# Patient Record
Sex: FEMALE | Race: WHITE | NOT HISPANIC OR LATINO | Employment: OTHER | URBAN - METROPOLITAN AREA
[De-identification: names, ages, dates, MRNs, and addresses within clinical notes are randomized per-mention and may not be internally consistent; named-entity substitution may affect disease eponyms.]

---

## 2017-02-22 PROCEDURE — 29125 APPL SHORT ARM SPLINT STATIC: CPT | Mod: LT

## 2017-02-22 PROCEDURE — 99284 EMERGENCY DEPT VISIT MOD MDM: CPT | Mod: 25

## 2017-02-23 ENCOUNTER — HOSPITAL ENCOUNTER (EMERGENCY)
Facility: OTHER | Age: 62
Discharge: HOME | End: 2017-02-23
Attending: EMERGENCY MEDICINE

## 2017-02-23 VITALS
TEMPERATURE: 98 F | HEART RATE: 77 BPM | HEIGHT: 69 IN | RESPIRATION RATE: 16 BRPM | OXYGEN SATURATION: 99 % | SYSTOLIC BLOOD PRESSURE: 150 MMHG | WEIGHT: 170 LBS | DIASTOLIC BLOOD PRESSURE: 80 MMHG | BODY MASS INDEX: 25.18 KG/M2

## 2017-02-23 DIAGNOSIS — S62.309A METACARPAL BONE FRACTURE: Primary | ICD-10-CM

## 2017-02-23 DIAGNOSIS — T14.90XA TRAUMA: ICD-10-CM

## 2017-02-23 PROCEDURE — 25000003 PHARM REV CODE 250: Performed by: EMERGENCY MEDICINE

## 2017-02-23 RX ORDER — HYDROCODONE BITARTRATE AND ACETAMINOPHEN 5; 325 MG/1; MG/1
1 TABLET ORAL EVERY 6 HOURS PRN
Qty: 22 TABLET | Refills: 0 | Status: SHIPPED | OUTPATIENT
Start: 2017-02-23

## 2017-02-23 RX ORDER — HYDROCODONE BITARTRATE AND ACETAMINOPHEN 5; 325 MG/1; MG/1
1 TABLET ORAL
Status: COMPLETED | OUTPATIENT
Start: 2017-02-23 | End: 2017-02-23

## 2017-02-23 RX ORDER — DOCUSATE SODIUM 100 MG/1
100 CAPSULE, LIQUID FILLED ORAL 2 TIMES DAILY
Qty: 30 CAPSULE | Refills: 0 | Status: SHIPPED | OUTPATIENT
Start: 2017-02-23

## 2017-02-23 RX ORDER — ASPIRIN 81 MG/1
81 TABLET ORAL DAILY
COMMUNITY

## 2017-02-23 RX ORDER — IBUPROFEN 400 MG/1
800 TABLET ORAL
Status: COMPLETED | OUTPATIENT
Start: 2017-02-23 | End: 2017-02-23

## 2017-02-23 RX ORDER — OMEPRAZOLE 20 MG/1
20 CAPSULE, DELAYED RELEASE ORAL DAILY
COMMUNITY

## 2017-02-23 RX ORDER — ONDANSETRON 4 MG/1
4 TABLET, ORALLY DISINTEGRATING ORAL EVERY 8 HOURS PRN
Qty: 12 TABLET | Refills: 0 | Status: SHIPPED | OUTPATIENT
Start: 2017-02-23

## 2017-02-23 RX ADMIN — HYDROCODONE BITARTRATE AND ACETAMINOPHEN 1 TABLET: 5; 325 TABLET ORAL at 12:02

## 2017-02-23 RX ADMIN — IBUPROFEN 800 MG: 400 TABLET, FILM COATED ORAL at 12:02

## 2017-02-23 NOTE — ED PROVIDER NOTES
Encounter Date: 2/22/2017    SCRIBE #1 NOTE: I, Donna Be , am scribing for, and in the presence of, Dr. Miramontes.       History     Chief Complaint   Patient presents with    Fall     fell onto left hand [place] parade. pain to left hand and wrist     Review of patient's allergies indicates:  No Known Allergies  HPI Comments: Time seen by provider: 12:29 AM    This is a 61 y.o. female who presents to the ED status post fall. Fall occurred prior to arrival. Pt fell onto her left hand after stepping in a crack on the sidewalk. Pt currently complains of pain to the left hand and wrist with associated swelling, abrasions, and bruising, but denies fever, chills, nausea, vomiting, head trauma, LOC, or numbness. Symptoms are described as constant and severe. Pt applied ice to the hand and wrist with little relief, but denies taking OTC medication to alleviate pain.     The history is provided by the patient.     Past Medical History   Diagnosis Date    Arthritis      No past medical history pertinent negatives.  Past Surgical History   Procedure Laterality Date    Tonsillectomy      Hysterectomy      Back surgery       No family history on file.  Social History   Substance Use Topics    Smoking status: Never Smoker    Smokeless tobacco: None    Alcohol use Yes      Comment: social     Review of Systems   Constitutional: Negative for chills and fever.   HENT: Negative for congestion and sore throat.         Negative for head trauma.    Eyes: Negative for redness and visual disturbance.   Respiratory: Negative for cough and shortness of breath.    Cardiovascular: Negative for chest pain and palpitations.   Gastrointestinal: Negative for abdominal pain, diarrhea, nausea and vomiting.   Genitourinary: Negative for dysuria.   Musculoskeletal: Positive for joint swelling. Negative for back pain.        Positive for pain to the left wrist and hand with associated swelling, bruising, and abrasions.    Skin:  Positive for color change and wound. Negative for rash.   Neurological: Negative for syncope, weakness, numbness and headaches.   Psychiatric/Behavioral: Negative for confusion.       Physical Exam   Initial Vitals   BP Pulse Resp Temp SpO2   02/22/17 2242 02/22/17 2242 02/22/17 2242 02/22/17 2242 02/22/17 2242   169/79 101 18 98 °F (36.7 °C) 98 %     Physical Exam    Nursing note and vitals reviewed.  Constitutional: She appears well-developed and well-nourished.   HENT:   Head: Normocephalic and atraumatic.   Eyes: Conjunctivae are normal.   Neck: Normal range of motion. Neck supple.   Cardiovascular:   Pulses:       Radial pulses are 2+ on the right side, and 2+ on the left side.   Pulmonary/Chest: No respiratory distress.   Musculoskeletal: Normal range of motion. She exhibits edema and tenderness.   Ecchymosis, tenderness, and swelling to both the dorsum and palm of L hand along the ulnar aspect. ROM is limited secondary to pain.  Sensation intact.  < 3 cap refill.     Neurological: She is alert and oriented to person, place, and time.   Ambulatory with steady gait.     Skin: Skin is warm and dry. No pallor.   Superficial abrasions noted to the L hand         ED Course   Orthopedic Injury  Date/Time: 2/23/2017 1:20 AM  Authorized by: HAYDEE PATTERSON   Performed by: HAYDEE PATTERSON.  Consent Done: Not Needed  Injury location: hand  Location details: left hand  Injury type: fracture  Fracture type: fourth metacarpal and fifth metacarpal  Pre-procedure distal perfusion: normal  Pre-procedure neurological function: normal  Pre-procedure neurovascular assessment: neurovascularly intact  Pre-procedure range of motion: reduced  Local anesthesia used: no    Anesthesia:  Local anesthesia used: no  Sedation:  Patient sedated: no    Manipulation performed: no  Immobilization: splint and sling  Splint type: ulnar gutter  Supplies used: Ortho-Glass  Complications: No  Specimens: No  Implants: No  Post-procedure neurovascular  assessment: post-procedure neurovascularly intact  Post-procedure distal perfusion: normal  Post-procedure neurological function: normal        Labs Reviewed - No data to display      Imaging Results         X-Ray Hand 3 view Left (Final result) Result time:  02/22/17 23:09:39    Final result by Harshil Torres MD (02/22/17 23:09:39)    Impression:        Acute fractures of the left proximal fifth metacarpal and distal fourth metacarpal, as above.      Electronically signed by: HARSHIL TORRES MD, MD  Date:     02/22/17  Time:    23:09     Narrative:    COMPARISON: None    FINDINGS: 3 views of the left wrist; 3 views of the left hand.        Generalized osteopenia.  Acute mildly comminuted fracture at the proximal meta-epiphyseal junction of the fifth metacarpal. There is associated mild impaction and slight apex angulation towards the dorsal and ulnar aspect. Small osseous fragments adjacent to the base of the fourth metacarpal without definite radiolucency seen, which likely arise from the fifth metacarpal comminuted fracture. No dislocation. Acute minimally displaced fracture through the distal metadiaphyseal junction of the fourth metacarpal. There is associated overlying soft tissue swelling related to both fracture sites. Carpus is otherwise intact. No dislocation. Mild degenerative change of the second through fifth DIP joints. No subcutaneous emphysema or radiodense retained foreign body.            X-Ray Wrist Complete Left (Final result) Result time:  02/22/17 23:09:39    Final result by Harshil Torres MD (02/22/17 23:09:39)    Impression:        Acute fractures of the left proximal fifth metacarpal and distal fourth metacarpal, as above.      Electronically signed by: HARSHIL TORRES MD, MD  Date:     02/22/17  Time:    23:09     Narrative:    COMPARISON: None    FINDINGS: 3 views of the left wrist; 3 views of the left hand.        Generalized osteopenia.  Acute mildly comminuted fracture at the proximal  meta-epiphyseal junction of the fifth metacarpal. There is associated mild impaction and slight apex angulation towards the dorsal and ulnar aspect. Small osseous fragments adjacent to the base of the fourth metacarpal without definite radiolucency seen, which likely arise from the fifth metacarpal comminuted fracture. No dislocation. Acute minimally displaced fracture through the distal metadiaphyseal junction of the fourth metacarpal. There is associated overlying soft tissue swelling related to both fracture sites. Carpus is otherwise intact. No dislocation. Mild degenerative change of the second through fifth DIP joints. No subcutaneous emphysema or radiodense retained foreign body.                X-Rays:   Independently Interpreted Readings:   Other Readings:  Left hand/wrist: Fracture at base of fifth metacarpal. Fracture of the distal fourth metacarpal.       Medical Decision Making:   History:   Old Medical Records: I decided to obtain old medical records.  Initial Assessment:   12:29AM:  Pt is a 60 y/o F who presents to ED s/p fall with L hand pain/swelling. Pt appears well, nontoxic. Will plan for ice pack, analgesia, xray, will continue to follow.    Independently Interpreted Test(s):   I have ordered and independently interpreted X-rays - see prior notes.  Clinical Tests:   Radiological Study: Ordered and Reviewed  Other:   I have discussed this case with another health care provider.      12:36AM:  Pt has a 5th metacarpal fx at the base and a 4 distal metacarpal fx.  I spoke with Dr. Schwartz, who recommends ulnar gutter splint    12:48AM:  I updated pt regarding results and plan for splint.  Pt is from [place] and going back tomorrow. She does have a hand surgeon that she can f/u with.  Will continue to follow.      1:16 AM:  Pt doing well.  She tolerating splint placement well and she is neurovascularly intact. Will provide her with sling along with a CD of her images and a radiology report to bring back  to [place]. I counseled pt regarding supportive care measures and splint care.  Will plan to provide pain medication to take as needed.  I have discussed with the pt ED return warnings and need to call her hand surgeon tomorrow.  Pt agreeable to plan and all questions answered.  I feel that pt is stable for discharge and management as an outpatient and no further intervention is needed at this time.  Pt is comfortable returning to the ED if needed.  Will DC home in stable condition.              Scribe Attestation:   Scribe #1: I performed the above scribed service and the documentation accurately describes the services I performed. I attest to the accuracy of the note.    Attending Attestation:           Physician Attestation for Scribe:  Physician Attestation Statement for Scribe #1: I, Dr. Miramontes, reviewed documentation, as scribed by Donna Be  in my presence, and it is both accurate and complete.                 ED Course     Clinical Impression:     1. Metacarpal bone fracture    2. Trauma                Vicky Miramontes MD  02/23/17 1403

## 2017-02-23 NOTE — DISCHARGE INSTRUCTIONS
Follow up with a hand surgeon.  Let them know you have a closed fracture of the base of the 5th metacarpal and the distal 4th metacarpal.      Keep the splint clean and dry.    We have prescribed you pain medication. Please fill and take as directed. Do not drink alcohol or drive while taking this medication.  Do not take any additional tylenol while taking this medication.  It is important to take stool softeners and/or eat plenty of fiber to prevent constipation while taking this medication.

## 2017-02-23 NOTE — ED TRIAGE NOTES
Pt c/o left arm pain after [place] the [place]. Pt denies head injury. Obvious deformity noted. +bruising to the palm. N/v intact with +2 radial pulse. Pt aaox4. Daughter at BS

## 2017-02-23 NOTE — ED AVS SNAPSHOT
[facility]  [address]  [facility] [address]               Leigha Woodruff   2017 12:11 AM   ED    Description:  Female : 1955   Department:  [facility]           Your Care was Coordinated By:     Provider Role From To    Vicky Miramontes MD Attending Provider 17 0014 --      Reason for Visit     Fall           Diagnoses this Visit        Comments    Metacarpal bone fracture    -  Primary     Trauma           ED Disposition     None           To Do List           Follow-up Information     Follow up with Hand Surgeon.       These Medications        Disp Refills Start End    hydrocodone-acetaminophen 5-325mg (NORCO) 5-325 mg per tablet 22 tablet 0 2017     Take 1 tablet by mouth every 6 (six) hours as needed for Pain. - Oral    docusate sodium (COLACE) 100 MG capsule 30 capsule 0 2017     Take 1 capsule (100 mg total) by mouth 2 (two) times daily. - Oral    ondansetron (ZOFRAN-ODT) 4 MG TbDL 12 tablet 0 2017     Take 1 tablet (4 mg total) by mouth every 8 (eight) hours as needed. - Oral      [facility] On [facility]     [facility] -  Assistance  Registered nurses in the [facility] provide clinical advisement, health education, appointment booking, and other advisory services.  Call for this free service at [phone].             Medications           Message regarding Medications     Verify the changes and/or additions to your medication regime listed below are the same as discussed with your clinician today.  If any of these changes or additions are incorrect, please notify your healthcare provider.        START taking these NEW medications        Refills    hydrocodone-acetaminophen 5-325mg (NORCO) 5-325 mg per tablet 0    Sig: Take 1 tablet by mouth every 6 (six) hours as needed for Pain.    Class: Print    Route: Oral    docusate sodium (COLACE) 100 MG capsule 0    Sig: Take 1  "capsule (100 mg total) by mouth 2 (two) times daily.    Class: Print    Route: Oral    ondansetron (ZOFRAN-ODT) 4 MG TbDL 0    Sig: Take 1 tablet (4 mg total) by mouth every 8 (eight) hours as needed.    Class: Print    Route: Oral      These medications were administered today        Dose Freq    hydrocodone-acetaminophen 5-325mg per tablet 1 tablet 1 tablet ED 1 Time    Sig: Take 1 tablet by mouth ED 1 Time.    Class: Normal    Route: Oral    ibuprofen tablet 800 mg 800 mg ED 1 Time    Sig: Take 2 tablets (800 mg total) by mouth ED 1 Time.    Class: Normal    Route: Oral           Verify that the below list of medications is an accurate representation of the medications you are currently taking.  If none reported, the list may be blank. If incorrect, please contact your healthcare provider. Carry this list with you in case of emergency.           Current Medications     aspirin (ECOTRIN) 81 MG EC tablet Take 81 mg by mouth once daily.    omeprazole (PRILOSEC) 20 MG capsule Take 20 mg by mouth once daily.    docusate sodium (COLACE) 100 MG capsule Take 1 capsule (100 mg total) by mouth 2 (two) times daily.    hydrocodone-acetaminophen 5-325mg (NORCO) 5-325 mg per tablet Take 1 tablet by mouth every 6 (six) hours as needed for Pain.    ondansetron (ZOFRAN-ODT) 4 MG TbDL Take 1 tablet (4 mg total) by mouth every 8 (eight) hours as needed.           Clinical Reference Information           Your Vitals Were     BP Pulse Temp Resp Height Weight    169/79 (BP Location: Left arm, Patient Position: Sitting) 101 98 °F (36.7 °C) (Oral) 18 5' 9" (1.753 m) 77.1 kg (170 lb)    SpO2 BMI             98% 25.1 kg/m2         Allergies as of 2/23/2017     No Known Allergies      Immunizations Administered on Date of Encounter - 2/23/2017     None      ED Micro, Lab, POCT     None      ED Imaging Orders     Start Ordered       Status Ordering Provider    02/22/17 1903 02/22/17 7681  X-Ray Hand 3 view Left  1 time imaging      Final " result     02/22/17 2250 02/22/17 2249  X-Ray Wrist Complete Left  1 time imaging      Final result         Discharge Instructions       Follow up with a hand surgeon.  Let them know you have a closed fracture of the base of the 5th metacarpal and the distal 4th metacarpal.      Keep the splint clean and dry.    We have prescribed you pain medication. Please fill and take as directed. Do not drink alcohol or drive while taking this medication.  Do not take any additional tylenol while taking this medication.  It is important to take stool softeners and/or eat plenty of fiber to prevent constipation while taking this medication.          Discharge References/Attachments     HAND FRACTURE, CLOSED (ADULT) (ENGLISH)    SPLINT CARE, FIBERGLASS (ENGLISH)      [facility] Sign-Up     Activating your [facility] account is as easy as 1-2-3!     1) Visit [email or web address], select Sign Up Now, enter this activation code and your date of birth, then select Next.  [email or web address]  Expires: 4/9/2017  1:19 AM      2) Create a username and password to use when you visit [facility] in the future and select a security question in case you lose your password and select Next.    3) Enter your e-mail address and click Sign Up!    Additional Information  If you have questions, please e-mail [email or web address] or call [phone] to talk to our [facility] staff. Remember, [employer] is NOT to be used for urgent needs. For medical emergencies, dial 911.          [facility] complies with applicable Federal civil rights laws and does not discriminate on the basis of race, color, national origin, age, disability, or sex.        Language Assistance Services     ATTENTION: Language assistance services are available, free of charge. Please call [phone].      ATENCIÓN: Si habla español, tiene a tan disposición servicios gratuitos de asistencia lingüística. Llame al [phone].     CHÚ Ý: N?u b?n nói Ti?ng Vi?t, có các  d?ch v? h? tr? sarah pugh? mi?n phí dành cho b?n. G?i s? [phone].